# Patient Record
Sex: MALE | Race: WHITE | Employment: OTHER | URBAN - METROPOLITAN AREA
[De-identification: names, ages, dates, MRNs, and addresses within clinical notes are randomized per-mention and may not be internally consistent; named-entity substitution may affect disease eponyms.]

---

## 2021-09-06 ENCOUNTER — APPOINTMENT (OUTPATIENT)
Dept: GENERAL RADIOLOGY | Age: 46
End: 2021-09-06
Payer: COMMERCIAL

## 2021-09-06 ENCOUNTER — HOSPITAL ENCOUNTER (EMERGENCY)
Age: 46
Discharge: HOME | End: 2021-09-06
Attending: EMERGENCY MEDICINE
Payer: COMMERCIAL

## 2021-09-06 VITALS
TEMPERATURE: 97.3 F | HEART RATE: 90 BPM | DIASTOLIC BLOOD PRESSURE: 82 MMHG | OXYGEN SATURATION: 96 % | SYSTOLIC BLOOD PRESSURE: 150 MMHG | HEIGHT: 71 IN | RESPIRATION RATE: 16 BRPM | WEIGHT: 216 LBS | BODY MASS INDEX: 30.24 KG/M2

## 2021-09-06 DIAGNOSIS — J18.9 ATYPICAL PNEUMONIA: ICD-10-CM

## 2021-09-06 DIAGNOSIS — Z20.822 SUSPECTED COVID-19 VIRUS INFECTION: Primary | ICD-10-CM

## 2021-09-06 PROCEDURE — U0003 INFECTIOUS AGENT DETECTION BY NUCLEIC ACID (DNA OR RNA); SEVERE ACUTE RESPIRATORY SYNDROME CORONAVIRUS 2 (SARS-COV-2) (CORONAVIRUS DISEASE [COVID-19]), AMPLIFIED PROBE TECHNIQUE, MAKING USE OF HIGH THROUGHPUT TECHNOLOGIES AS DESCRIBED BY CMS-2020-01-R: HCPCS

## 2021-09-06 PROCEDURE — 71045 X-RAY EXAM CHEST 1 VIEW: CPT

## 2021-09-06 PROCEDURE — 99283 EMERGENCY DEPT VISIT LOW MDM: CPT

## 2021-09-06 PROCEDURE — U0005 INFEC AGEN DETEC AMPLI PROBE: HCPCS

## 2021-09-06 RX ORDER — ALBUTEROL SULFATE 90 UG/1
2 AEROSOL, METERED RESPIRATORY (INHALATION) 4 TIMES DAILY PRN
Qty: 18 G | Refills: 0 | Status: SHIPPED | OUTPATIENT
Start: 2021-09-06

## 2021-09-06 RX ORDER — DEXAMETHASONE 6 MG/1
6 TABLET ORAL 2 TIMES DAILY WITH MEALS
Qty: 20 TABLET | Refills: 0 | Status: SHIPPED | OUTPATIENT
Start: 2021-09-06 | End: 2021-09-16

## 2021-09-06 RX ORDER — AZITHROMYCIN 250 MG/1
TABLET, FILM COATED ORAL
Qty: 1 PACKET | Refills: 0 | Status: SHIPPED | OUTPATIENT
Start: 2021-09-06 | End: 2021-09-10

## 2021-09-06 ASSESSMENT — PAIN SCALES - GENERAL: PAINLEVEL_OUTOF10: 3

## 2021-09-06 ASSESSMENT — PAIN DESCRIPTION - PAIN TYPE: TYPE: ACUTE PAIN

## 2021-09-06 NOTE — ED PROVIDER NOTES
HPI:  9/6/21, Time: 4:25 PM EDT         Ulises Marin is a 40 y.o. male presenting to the ED for fevers fatigue cough achiness watery diarrhea, beginning 4 to 5 days ago. The complaint has been persistent, moderate in severity, and worsened by nothing. Patient reports fever 2.3 at home. Symptoms concerning for a Covid infection. He did not get vaccinated. No close contacts are ill. States that diarrhea has been watery. There is no hemoptysis. No nausea vomiting reported. He has no chest pain. ROS:   Pertinent positives and negatives are stated within HPI, all other systems reviewed and are negative.  --------------------------------------------- PAST HISTORY ---------------------------------------------  Past Medical History:  has no past medical history on file. Past Surgical History:  has a past surgical history that includes LASIK; shoulder surgery; and hernia repair. Social History:  reports that he has never smoked. He does not have any smokeless tobacco history on file. He reports current alcohol use. He reports that he does not use drugs. Family History: family history is not on file. The patients home medications have been reviewed. Allergies: Patient has no known allergies. ---------------------------------------------------PHYSICAL EXAM--------------------------------------    Constitutional/General: Alert and oriented x3, well appearing, non toxic in NAD  Head: Normocephalic and atraumatic  Eyes: PERRL, EOMI  Mouth: Oropharynx clear, handling secretions, no trismus  Neck: Supple, full ROM, non tender to palpation in the midline, no stridor, no crepitus, no meningeal signs  Pulmonary: Lungs clear to auscultation bilaterally, no wheezes, rales, or rhonchi. Not in respiratory distress  Cardiovascular:  Regular rate. Regular rhythm. No murmurs, gallops, or rubs. 2+ distal pulses  Chest: no chest wall tenderness  Abdomen: Soft. Non tender. Non distended. +BS.   No rebound, guarding, or rigidity. No pulsatile masses appreciated. Musculoskeletal: Moves all extremities x 4. Warm and well perfused, no clubbing, cyanosis, or edema. Capillary refill <3 seconds  Skin: warm and dry. No rashes. Neurologic: GCS 15, CN 2-12 grossly intact, no focal deficits, symmetric strength 5/5 in the upper and lower extremities bilaterally  Psych: Normal Affect    -------------------------------------------------- RESULTS -------------------------------------------------  I have personally reviewed all laboratory and imaging results for this patient. Results are listed below. LABS:  No results found for this visit on 09/06/21. RADIOLOGY:  Interpreted by Radiologist.  XR CHEST PORTABLE   Final Result   Hazy patchy opacities in both lungs, highly suspicious for atypical pneumonia.                   ------------------------- NURSING NOTES AND VITALS REVIEWED ---------------------------   The nursing notes within the ED encounter and vital signs as below have been reviewed by myself. BP (!) 150/82   Pulse 90   Temp 97.3 °F (36.3 °C) (Infrared)   Resp 16   Ht 5' 11\" (1.803 m)   Wt 216 lb (98 kg)   SpO2 96%   BMI 30.13 kg/m²   Oxygen Saturation Interpretation: Normal    The patients available past medical records and past encounters were reviewed. ------------------------------ ED COURSE/MEDICAL DECISION MAKING----------------------  Medications - No data to display          Medical Decision Making:     Patient had outpatient Covid test.  His vital signs are stable sats 96% currently on room air. Patient's chest x-ray highly suspicious for atypical pneumonia. Patient be discharged on Zithromax albuterol inhaler and pulse ox. He was instructed to return if his symptoms worsen fever or his pulse ox drops below 90%. Re-Evaluations:             Re-evaluation.   Patients symptoms show no change      Consultations:             NONE    Critical Care: NONE        This patient's ED course included: a personal history and physicial examination, re-evaluation prior to disposition, continuous pulse oximetry, complex medical decision making and emergency management and a personal history and physicial eaxmination    This patient has remained hemodynamically stable, remained unchanged and been closely monitored during their ED course. Counseling: The emergency provider has spoken with the patient and discussed todays results, in addition to providing specific details for the plan of care and counseling regarding the diagnosis and prognosis. Questions are answered at this time and they are agreeable with the plan.       --------------------------------- IMPRESSION AND DISPOSITION ---------------------------------    IMPRESSION  1. Suspected COVID-19 virus infection    2. Atypical pneumonia        DISPOSITION  Disposition: Discharge to home  Patient condition is stable        NOTE: This report was transcribed using voice recognition software.  Every effort was made to ensure accuracy; however, inadvertent computerized transcription errors may be present        Ronny Alvarez DO  09/06/21 [id number]

## 2021-09-07 ENCOUNTER — CARE COORDINATION (OUTPATIENT)
Dept: CARE COORDINATION | Age: 46
End: 2021-09-07

## 2021-09-07 NOTE — CARE COORDINATION
Patient contacted regarding COVID-19 risk, exposure, diagnosis, pulse oximeter ordered at discharge and monoclonal antibody infusion follow up. Discussed COVID-19 related testing which was pending at this time. Test results were pending. Patient informed of results, if available? Yes. Ambulatory Care Manager contacted the patient by telephone to perform post discharge assessment. Call within 2 business days of discharge: Yes. Verified name and  with patient as identifiers. Provided introduction to self, and explanation of the CTN/ACM role, and reason for call due to risk factors for infection and/or exposure to COVID-19. Symptoms reviewed with patient who verbalized the following symptoms: fever, fatigue, cough, loss of taste or smell, no new symptoms and no worsening symptoms. Due to no new or worsening symptoms encounter was not routed to provider for escalation. Discussed follow-up appointments. If no appointment was previously scheduled, appointment scheduling offered: Yes. [facility] follow up appointment(s): No future appointments. Non-[facility] follow up appointment(s): no  -Pt reports he called PCP office today and he is waiting for a return call from PCP. Non-face-to-face services provided:  Reviewed AVS     Advance Care Planning:   Does patient have an Advance Directive:  decision maker updated. Educated patient about risk for severe COVID-19 due to risk factors according to CDC guidelines. ACM reviewed discharge instructions, medical action plan and red flag symptoms with the patient who verbalized understanding. Discussed COVID vaccination status: Yes. Education provided on COVID-19 vaccination as appropriate. Discussed exposure protocols and quarantine with CDC Guidelines. Patient was given an opportunity to verbalize any questions and concerns and agrees to contact ACM or health care provider for questions related to their healthcare.     Reviewed and educated patient on any new and changed medications related to discharge diagnosis     Was patient discharged with a pulse oximeter? Yes Discussed and confirmed pulse oximeter discharge instructions and when to notify provider or seek emergency care. -Wife [facility] answered the phone. Marcella put ACM on speaker phone. Pt gave consent to wife being a part of the conversation.    -Pt reports feeling about the same. Fever is intermittent. Fatigue continues. Dry cough is less. Taste and smell are off. Diarrhea is gone. No SOB. Pulse ox monitor range 91-94% at rest.  Pulse ox was 92% &  at rest while on the phone with ACM. Pt and Marcella aware pulse ox is to remain 90% and above and if reading goes below 90%, Pt is to return to the ED for evaluation.   -Pt started Z Delbert yesterday but Decadron was started today. Pt has the albuterol inhaler but has not needed it. Pt reports intermittently taking Theraflu, Advil and Airborne chewables.   -Pt reports keeping hydrated and appetite is low.  -Offered [facility], Pt accepted. AC submitted Pt's email via [employer] and gave the [facility] support phone number. Pt said he received the email, will initated and will monitor for the Covid test results.    -Pt reports he did not receive the vaccine and he is not interested when symptoms resolve and he is eligible. AC provided contact information. No further follow-up call identified based on severity of symptoms and risk factors.

## 2021-09-08 LAB — SARS-COV-2, PCR: DETECTED

## 2024-03-21 ENCOUNTER — OFFICE VISIT (OUTPATIENT)
Dept: FAMILY MEDICINE CLINIC | Age: 49
End: 2024-03-21
Payer: COMMERCIAL

## 2024-03-21 VITALS
OXYGEN SATURATION: 96 % | HEIGHT: 71 IN | BODY MASS INDEX: 34.02 KG/M2 | RESPIRATION RATE: 18 BRPM | TEMPERATURE: 97.6 F | SYSTOLIC BLOOD PRESSURE: 142 MMHG | HEART RATE: 87 BPM | DIASTOLIC BLOOD PRESSURE: 93 MMHG | WEIGHT: 243 LBS

## 2024-03-21 DIAGNOSIS — Z00.00 WELL ADULT EXAM: Primary | ICD-10-CM

## 2024-03-21 DIAGNOSIS — R06.02 SHORTNESS OF BREATH: ICD-10-CM

## 2024-03-21 DIAGNOSIS — Z87.09 HISTORY OF ARDS: ICD-10-CM

## 2024-03-21 LAB
ALBUMIN SERPL-MCNC: 4.6 G/DL (ref 3.5–5.2)
ALP BLD-CCNC: 76 U/L (ref 40–129)
ALT SERPL-CCNC: 31 U/L (ref 0–40)
ANION GAP SERPL CALCULATED.3IONS-SCNC: 12 MMOL/L (ref 7–16)
AST SERPL-CCNC: 25 U/L (ref 0–39)
BILIRUB SERPL-MCNC: 0.5 MG/DL (ref 0–1.2)
BUN BLDV-MCNC: 15 MG/DL (ref 6–20)
CALCIUM SERPL-MCNC: 9.1 MG/DL (ref 8.6–10.2)
CHLORIDE BLD-SCNC: 101 MMOL/L (ref 98–107)
CHOLESTEROL: 226 MG/DL
CO2: 26 MMOL/L (ref 22–29)
CREAT SERPL-MCNC: 0.9 MG/DL (ref 0.7–1.2)
GFR SERPL CREATININE-BSD FRML MDRD: >60 ML/MIN/1.73M2
GLUCOSE BLD-MCNC: 88 MG/DL (ref 74–99)
HCT VFR BLD CALC: 48.8 % (ref 37–54)
HDLC SERPL-MCNC: 35 MG/DL
HEMOGLOBIN: 16.2 G/DL (ref 12.5–16.5)
LDL CHOLESTEROL: 148 MG/DL
MCH RBC QN AUTO: 28.9 PG (ref 26–35)
MCHC RBC AUTO-ENTMCNC: 33.2 G/DL (ref 32–34.5)
MCV RBC AUTO: 87.1 FL (ref 80–99.9)
PDW BLD-RTO: 12 % (ref 11.5–15)
PLATELET # BLD: 212 K/UL (ref 130–450)
PMV BLD AUTO: 11.8 FL (ref 7–12)
POTASSIUM SERPL-SCNC: 4.4 MMOL/L (ref 3.5–5)
RBC # BLD: 5.6 M/UL (ref 3.8–5.8)
SODIUM BLD-SCNC: 139 MMOL/L (ref 132–146)
TOTAL PROTEIN: 7.4 G/DL (ref 6.4–8.3)
TRIGL SERPL-MCNC: 217 MG/DL
VLDLC SERPL CALC-MCNC: 43 MG/DL
WBC # BLD: 7.6 K/UL (ref 4.5–11.5)

## 2024-03-21 PROCEDURE — 99396 PREV VISIT EST AGE 40-64: CPT | Performed by: FAMILY MEDICINE

## 2024-03-21 PROCEDURE — 93000 ELECTROCARDIOGRAM COMPLETE: CPT | Performed by: FAMILY MEDICINE

## 2024-03-21 PROCEDURE — 36415 COLL VENOUS BLD VENIPUNCTURE: CPT | Performed by: FAMILY MEDICINE

## 2024-03-21 PROCEDURE — G8484 FLU IMMUNIZE NO ADMIN: HCPCS | Performed by: FAMILY MEDICINE

## 2024-03-21 SDOH — ECONOMIC STABILITY: INCOME INSECURITY: HOW HARD IS IT FOR YOU TO PAY FOR THE VERY BASICS LIKE FOOD, HOUSING, MEDICAL CARE, AND HEATING?: NOT HARD AT ALL

## 2024-03-21 SDOH — ECONOMIC STABILITY: FOOD INSECURITY: WITHIN THE PAST 12 MONTHS, THE FOOD YOU BOUGHT JUST DIDN'T LAST AND YOU DIDN'T HAVE MONEY TO GET MORE.: NEVER TRUE

## 2024-03-21 SDOH — ECONOMIC STABILITY: FOOD INSECURITY: WITHIN THE PAST 12 MONTHS, YOU WORRIED THAT YOUR FOOD WOULD RUN OUT BEFORE YOU GOT MONEY TO BUY MORE.: NEVER TRUE

## 2024-03-21 SDOH — ECONOMIC STABILITY: HOUSING INSECURITY
IN THE LAST 12 MONTHS, WAS THERE A TIME WHEN YOU DID NOT HAVE A STEADY PLACE TO SLEEP OR SLEPT IN A SHELTER (INCLUDING NOW)?: NO

## 2024-03-21 ASSESSMENT — PATIENT HEALTH QUESTIONNAIRE - PHQ9
SUM OF ALL RESPONSES TO PHQ QUESTIONS 1-9: 0
SUM OF ALL RESPONSES TO PHQ QUESTIONS 1-9: 0
2. FEELING DOWN, DEPRESSED OR HOPELESS: NOT AT ALL
SUM OF ALL RESPONSES TO PHQ9 QUESTIONS 1 & 2: 0
1. LITTLE INTEREST OR PLEASURE IN DOING THINGS: NOT AT ALL
SUM OF ALL RESPONSES TO PHQ QUESTIONS 1-9: 0
SUM OF ALL RESPONSES TO PHQ QUESTIONS 1-9: 0

## 2024-03-21 NOTE — PROGRESS NOTES
Chief Complaint:   Jaquan Bobo is a 48 y.o. female who presents for complete physical examination    History of Present Illness:        Patient is here for well visit. Upon further discussion, he notes [facility].    In 2021 he did get COVID and was hospitalized at [facility],  and was on oxygen for 5 months after the infection.  He was very dehydrated during his stay.  He was weak.  Near to being on the vent.  CT was suspicious for ARDS.  He never did follow up with pulmonology.  Requesting follow up due to persistent issues with shortness of breath.  Notes a prior feeling that there was someone standing on his chest.  His O2 sats sometimes drop to 91-92%.  BP is mildly elevated.  Occasionally has coughing spells.  Non smoker.  Symptoms worse in the cold and in the higher humidity.     He is planning to lose weight.  He notes that his breathing has been worse since he has gained weight.    There is no problem list on file for this patient.    Past Medical History:   Diagnosis Date    ARDS (adult respiratory distress syndrome) (HCC)     COVID-19 long hauler manifesting chronic dyspnea        Past Surgical History:   Procedure Laterality Date    HERNIA REPAIR      2, at birth    LASIK      SHOULDER SURGERY         Current Outpatient Medications   Medication Sig Dispense Refill    albuterol sulfate HFA (VENTOLIN HFA) 108 (90 Base) MCG/ACT inhaler Inhale 2 puffs into the lungs 4 times daily as needed for Wheezing (Patient not taking: Reported on 3/21/2024) 18 g 0    cetirizine (ZYRTEC) 10 MG tablet Take 1 tablet by mouth daily (Patient not taking: Reported on 3/21/2024) 10 tablet 0     No current facility-administered medications for this visit.     No Known Allergies    Social History     Socioeconomic History    Marital status:      Spouse name: None    Number of children: None    Years of education: None    Highest education level: None   Tobacco Use    Smoking status: Never     Passive exposure: Never

## 2024-08-12 ENCOUNTER — HOSPITAL ENCOUNTER (OUTPATIENT)
Dept: CT IMAGING | Age: 49
Discharge: HOME | End: 2024-08-14
Attending: INTERNAL MEDICINE
Payer: COMMERCIAL

## 2024-08-12 DIAGNOSIS — R91.1 PULMONARY NODULE: ICD-10-CM

## 2024-08-12 DIAGNOSIS — U09.9 COVID-19 LONG HAULER: ICD-10-CM

## 2024-08-12 PROCEDURE — 71250 CT THORAX DX C-: CPT

## 2025-04-04 ENCOUNTER — OFFICE VISIT (OUTPATIENT)
Dept: FAMILY MEDICINE CLINIC | Age: 50
End: 2025-04-04
Payer: COMMERCIAL

## 2025-04-04 VITALS
TEMPERATURE: 98 F | WEIGHT: 238 LBS | HEIGHT: 71 IN | BODY MASS INDEX: 33.32 KG/M2 | HEART RATE: 80 BPM | SYSTOLIC BLOOD PRESSURE: 139 MMHG | OXYGEN SATURATION: 96 % | DIASTOLIC BLOOD PRESSURE: 96 MMHG | RESPIRATION RATE: 18 BRPM

## 2025-04-04 DIAGNOSIS — J06.9 VIRAL URI: Primary | ICD-10-CM

## 2025-04-04 DIAGNOSIS — S16.1XXA STRAIN OF NECK MUSCLE, INITIAL ENCOUNTER: ICD-10-CM

## 2025-04-04 DIAGNOSIS — S29.019A THORACIC MYOFASCIAL STRAIN, INITIAL ENCOUNTER: ICD-10-CM

## 2025-04-04 PROCEDURE — 99214 OFFICE O/P EST MOD 30 MIN: CPT | Performed by: FAMILY MEDICINE

## 2025-04-04 PROCEDURE — G8417 CALC BMI ABV UP PARAM F/U: HCPCS | Performed by: FAMILY MEDICINE

## 2025-04-04 PROCEDURE — 1036F TOBACCO NON-USER: CPT | Performed by: FAMILY MEDICINE

## 2025-04-04 PROCEDURE — G8428 CUR MEDS NOT DOCUMENT: HCPCS | Performed by: FAMILY MEDICINE

## 2025-04-04 RX ORDER — ALBUTEROL SULFATE 90 UG/1
2 INHALANT RESPIRATORY (INHALATION) 4 TIMES DAILY PRN
Qty: 18 G | Refills: 5 | Status: SHIPPED | OUTPATIENT
Start: 2025-04-04

## 2025-04-04 RX ORDER — CELECOXIB 200 MG/1
200 CAPSULE ORAL DAILY
Qty: 10 CAPSULE | Refills: 0 | Status: SHIPPED | OUTPATIENT
Start: 2025-04-04 | End: 2025-04-14

## 2025-04-04 SDOH — ECONOMIC STABILITY: FOOD INSECURITY: WITHIN THE PAST 12 MONTHS, YOU WORRIED THAT YOUR FOOD WOULD RUN OUT BEFORE YOU GOT MONEY TO BUY MORE.: NEVER TRUE

## 2025-04-04 SDOH — ECONOMIC STABILITY: FOOD INSECURITY: WITHIN THE PAST 12 MONTHS, THE FOOD YOU BOUGHT JUST DIDN'T LAST AND YOU DIDN'T HAVE MONEY TO GET MORE.: NEVER TRUE

## 2025-04-04 ASSESSMENT — PATIENT HEALTH QUESTIONNAIRE - PHQ9
SUM OF ALL RESPONSES TO PHQ QUESTIONS 1-9: 0
SUM OF ALL RESPONSES TO PHQ QUESTIONS 1-9: 0
2. FEELING DOWN, DEPRESSED OR HOPELESS: NOT AT ALL
SUM OF ALL RESPONSES TO PHQ QUESTIONS 1-9: 0
SUM OF ALL RESPONSES TO PHQ QUESTIONS 1-9: 0
1. LITTLE INTEREST OR PLEASURE IN DOING THINGS: NOT AT ALL

## 2025-04-04 NOTE — PROGRESS NOTES
lesions  Musculoskeletal: ROM grossly normal in all joints of extremities, no obvious joint swelling.  Right shoulder, full ROM, Minimal pain with ROM.  No specific point tenderness.  Normal empty cup.  Normal internal and external rotation.  Normal Lee sign; normal Lift off sign.  Lymph nodes: No lymph node enlargement appreciated    I independently reviewed the following information:  office notes from pulmonology    1. Viral URI  2. Strain of neck muscle, initial encounter  3. Thoracic myofascial strain, initial encounter      Additional Plan:    Continue home exercises  Trial of celebrex  Refill of albuterol        RTO in prn or sooner for any persistent, new, or worsening symptoms.      Please see Patient Instructions for further counseling and information given.       Advised to be adherent to the treatment plans discussed today, and please call with any questions or concerns, letting the office know of any reasons that the plans may not be followed.  The risks of untreated conditions include worsening illness, injury, disability, and possibly, death. Please call if symptoms change in any way, worsen, or fail to completely resolve, as this could necessitate a change to treatment plans. Patient and/or caregiver expressed understanding.     Indications and proper use of medication(s) reviewed.  Potential side-effects and risks of medication(s) also explained.  Patient and/or caregiver was instructed to call if any new symptoms develop prior to next visit.    Health risk factors discussed and addressed.    Signed by : JESSY EDGE MD, M.D.